# Patient Record
Sex: MALE | Race: BLACK OR AFRICAN AMERICAN | NOT HISPANIC OR LATINO | Employment: OTHER | URBAN - METROPOLITAN AREA
[De-identification: names, ages, dates, MRNs, and addresses within clinical notes are randomized per-mention and may not be internally consistent; named-entity substitution may affect disease eponyms.]

---

## 2024-09-04 ENCOUNTER — OFFICE VISIT (OUTPATIENT)
Dept: PRIMARY CARE CLINIC | Facility: CLINIC | Age: 79
End: 2024-09-04
Payer: MEDICARE

## 2024-09-04 ENCOUNTER — HOSPITAL ENCOUNTER (OUTPATIENT)
Dept: RADIOLOGY | Facility: HOSPITAL | Age: 79
Discharge: HOME | End: 2024-09-04
Attending: FAMILY MEDICINE
Payer: MEDICARE

## 2024-09-04 VITALS
BODY MASS INDEX: 22.3 KG/M2 | HEART RATE: 74 BPM | TEMPERATURE: 98 F | SYSTOLIC BLOOD PRESSURE: 126 MMHG | WEIGHT: 130.63 LBS | DIASTOLIC BLOOD PRESSURE: 70 MMHG | OXYGEN SATURATION: 99 % | HEIGHT: 64 IN

## 2024-09-04 DIAGNOSIS — N18.4 CKD (CHRONIC KIDNEY DISEASE) STAGE 4, GFR 15-29 ML/MIN: ICD-10-CM

## 2024-09-04 DIAGNOSIS — Z11.59 ENCOUNTER FOR HEPATITIS C SCREENING TEST FOR LOW RISK PATIENT: ICD-10-CM

## 2024-09-04 DIAGNOSIS — E78.2 MIXED HYPERLIPIDEMIA: ICD-10-CM

## 2024-09-04 DIAGNOSIS — J45.40 MODERATE PERSISTENT ASTHMA WITHOUT COMPLICATION: ICD-10-CM

## 2024-09-04 DIAGNOSIS — R06.02 SOB (SHORTNESS OF BREATH): Primary | ICD-10-CM

## 2024-09-04 DIAGNOSIS — E87.6 DIURETIC-INDUCED HYPOKALEMIA: ICD-10-CM

## 2024-09-04 DIAGNOSIS — I50.9 CONGESTIVE HEART FAILURE, UNSPECIFIED HF CHRONICITY, UNSPECIFIED HEART FAILURE TYPE: Chronic | ICD-10-CM

## 2024-09-04 DIAGNOSIS — K21.9 GERD WITHOUT ESOPHAGITIS: Chronic | ICD-10-CM

## 2024-09-04 DIAGNOSIS — T50.2X5A DIURETIC-INDUCED HYPOKALEMIA: ICD-10-CM

## 2024-09-04 DIAGNOSIS — J43.8 OTHER EMPHYSEMA: ICD-10-CM

## 2024-09-04 DIAGNOSIS — Z12.5 PROSTATE CANCER SCREENING: ICD-10-CM

## 2024-09-04 DIAGNOSIS — Z11.3 SCREEN FOR STD (SEXUALLY TRANSMITTED DISEASE): ICD-10-CM

## 2024-09-04 DIAGNOSIS — I10 ESSENTIAL HYPERTENSION: ICD-10-CM

## 2024-09-04 DIAGNOSIS — R73.03 PREDIABETES: Chronic | ICD-10-CM

## 2024-09-04 DIAGNOSIS — I50.9 CONGESTIVE HEART FAILURE, UNSPECIFIED HF CHRONICITY, UNSPECIFIED HEART FAILURE TYPE: ICD-10-CM

## 2024-09-04 PROBLEM — Z99.2 DEPENDENCE ON RENAL DIALYSIS: Status: ACTIVE | Noted: 2024-09-04

## 2024-09-04 PROBLEM — I50.23 ACUTE ON CHRONIC SYSTOLIC (CONGESTIVE) HEART FAILURE: Status: ACTIVE | Noted: 2024-09-04

## 2024-09-04 PROBLEM — I50.23 ACUTE ON CHRONIC SYSTOLIC (CONGESTIVE) HEART FAILURE: Status: RESOLVED | Noted: 2024-09-04 | Resolved: 2024-09-04

## 2024-09-04 PROBLEM — N25.81 SECONDARY HYPERPARATHYROIDISM OF RENAL ORIGIN: Status: ACTIVE | Noted: 2024-09-04

## 2024-09-04 PROCEDURE — 99205 OFFICE O/P NEW HI 60 MIN: CPT | Mod: S$GLB,,, | Performed by: FAMILY MEDICINE

## 2024-09-04 PROCEDURE — 1159F MED LIST DOCD IN RCRD: CPT | Mod: CPTII,S$GLB,, | Performed by: FAMILY MEDICINE

## 2024-09-04 PROCEDURE — 1160F RVW MEDS BY RX/DR IN RCRD: CPT | Mod: CPTII,S$GLB,, | Performed by: FAMILY MEDICINE

## 2024-09-04 PROCEDURE — 71046 X-RAY EXAM CHEST 2 VIEWS: CPT | Mod: TC,PN

## 2024-09-04 PROCEDURE — 71046 X-RAY EXAM CHEST 2 VIEWS: CPT | Mod: 26,,, | Performed by: STUDENT IN AN ORGANIZED HEALTH CARE EDUCATION/TRAINING PROGRAM

## 2024-09-04 PROCEDURE — 1126F AMNT PAIN NOTED NONE PRSNT: CPT | Mod: CPTII,S$GLB,, | Performed by: FAMILY MEDICINE

## 2024-09-04 PROCEDURE — 3078F DIAST BP <80 MM HG: CPT | Mod: CPTII,S$GLB,, | Performed by: FAMILY MEDICINE

## 2024-09-04 PROCEDURE — 1101F PT FALLS ASSESS-DOCD LE1/YR: CPT | Mod: CPTII,S$GLB,, | Performed by: FAMILY MEDICINE

## 2024-09-04 PROCEDURE — 3288F FALL RISK ASSESSMENT DOCD: CPT | Mod: CPTII,S$GLB,, | Performed by: FAMILY MEDICINE

## 2024-09-04 PROCEDURE — 3074F SYST BP LT 130 MM HG: CPT | Mod: CPTII,S$GLB,, | Performed by: FAMILY MEDICINE

## 2024-09-04 PROCEDURE — 99999 PR PBB SHADOW E&M-NEW PATIENT-LVL V: CPT | Mod: PBBFAC,,, | Performed by: FAMILY MEDICINE

## 2024-09-04 RX ORDER — BUMETANIDE 2 MG/1
TABLET ORAL
COMMUNITY

## 2024-09-04 RX ORDER — BUDESONIDE AND FORMOTEROL FUMARATE DIHYDRATE 160; 4.5 UG/1; UG/1
AEROSOL RESPIRATORY (INHALATION)
COMMUNITY
Start: 2024-03-11

## 2024-09-04 RX ORDER — BUDESONIDE AND FORMOTEROL FUMARATE DIHYDRATE 160; 4.5 UG/1; UG/1
2 AEROSOL RESPIRATORY (INHALATION) EVERY 12 HOURS
Qty: 10.2 G | Refills: 11 | Status: SHIPPED | OUTPATIENT
Start: 2024-09-04 | End: 2025-09-04

## 2024-09-04 RX ORDER — DOXYCYCLINE 100 MG/1
100 CAPSULE ORAL 2 TIMES DAILY
Qty: 20 CAPSULE | Refills: 0 | Status: SHIPPED | OUTPATIENT
Start: 2024-09-04 | End: 2024-09-14

## 2024-09-04 RX ORDER — ALBUTEROL SULFATE 90 UG/1
2 POWDER, METERED RESPIRATORY (INHALATION) EVERY 6 HOURS PRN
Qty: 1 EACH | Refills: 11 | Status: SHIPPED | OUTPATIENT
Start: 2024-09-04

## 2024-09-04 RX ORDER — ATORVASTATIN CALCIUM 80 MG/1
TABLET, FILM COATED ORAL
COMMUNITY

## 2024-09-04 RX ORDER — ESOMEPRAZOLE MAGNESIUM 40 MG/1
CAPSULE, DELAYED RELEASE ORAL
COMMUNITY

## 2024-09-04 RX ORDER — POTASSIUM CHLORIDE 750 MG/1
10 TABLET, EXTENDED RELEASE ORAL
COMMUNITY

## 2024-09-04 RX ORDER — IPRATROPIUM BROMIDE AND ALBUTEROL SULFATE 2.5; .5 MG/3ML; MG/3ML
3 SOLUTION RESPIRATORY (INHALATION)
Status: SHIPPED | OUTPATIENT
Start: 2024-09-04

## 2024-09-04 RX ORDER — EMPAGLIFLOZIN 10 MG/1
10 TABLET, FILM COATED ORAL
COMMUNITY

## 2024-09-04 RX ORDER — BUDESONIDE AND FORMOTEROL FUMARATE DIHYDRATE 80; 4.5 UG/1; UG/1
2 AEROSOL RESPIRATORY (INHALATION)
COMMUNITY
End: 2024-09-04 | Stop reason: SDUPTHER

## 2024-09-04 RX ORDER — ALBUTEROL SULFATE 90 UG/1
1-2 AEROSOL, METERED RESPIRATORY (INHALATION) DAILY PRN
COMMUNITY
End: 2024-09-04

## 2024-09-04 RX ORDER — CARVEDILOL 12.5 MG/1
12.5 TABLET ORAL 2 TIMES DAILY
COMMUNITY
Start: 2024-06-13

## 2024-09-04 RX ORDER — ALBUTEROL SULFATE 90 UG/1
POWDER, METERED RESPIRATORY (INHALATION)
COMMUNITY
Start: 2023-12-19 | End: 2024-09-04

## 2024-09-04 RX ORDER — IPRATROPIUM BROMIDE AND ALBUTEROL SULFATE 2.5; .5 MG/3ML; MG/3ML
SOLUTION RESPIRATORY (INHALATION)
COMMUNITY

## 2024-09-04 NOTE — PATIENT INSTRUCTIONS
DASH Diet   About this topic   DASH stands for Dietary Approaches to Stop Hypertension. The DASH diet may help you lower blood pressure. It may also help keep you from getting high blood pressure. You will eat less fat and more fiber on the DASH diet.  This diet gives you more minerals that fight high blood pressure. Some nutrients in this diet are:  Potassium ? Acts to help you get rid of salt. This may help to lower blood pressure.  Calcium ? Makes blood vessels and muscles work the right way  Magnesium - Helps blood vessels relax  Fiber ? Helps you feel full. It also helps digestion.  What will the results be?   The DASH diet may help you:  Lower your blood pressure and cholesterol  Lower your risk for cancer, heart disease, heart attack, and stroke. It may also lower your risk for heart failure, kidney stones, and diabetes.  Lose weight or keep a healthy weight  What lifestyle changes are needed?   Add regular exercise to get the most help from this diet.  Try to lower stress. Find ways to relax.  Stop smoking. Avoid secondhand smoke.  Limit alcohol intake.  What changes to diet are needed?   Know about poor eating habits. Then, you can fix them as you work with the program.  This diet encourages fruits and vegetables, whole grains, lean meats, healthy fats, and low-fat or fat-free dairy products.  This diet is lower in saturated fats, trans-fats, cholesterol, added sugars, and sodium.  Who should use this diet?   This eating plan is good for the whole family. It is also good for people with high blood pressure and those at risk for high blood pressure.  What foods are good to eat?   Grains: Try to eat 6 to 8 servings of whole grain, high fiber foods each day. These are bread, cereals, brown rice, or pasta.  Fruits and vegetables: Eat 4 to 5 servings each day. Try to pick many kinds and colors. Fresh or frozen are best. Look for low sodium or salt-free if you choose canned.  Dairy: Try to eat 2 to 3 servings of  fat free and low fat milk products each day.  Lean meats, poultry, and seafood: Try to eat 6 servings or less of lean meats, poultry, and seafood each day. Try to choose more low fat or lean meats like chicken and turkey. Eat less red meat. Eat more fish instead.  Nuts, seeds, and legumes (dry beans and peas): Try to eat 4 to 5 servings each week. Try to pick nuts such as almonds and walnuts, sunflower seeds, peanut butter, soy beans, lentils, kidney beans, and split peas.  Fats and oils: Try to eat 2 to 3 servings of fats and oils each day. Eat good fats found in fish, nuts, and avocados. Try using olive oil or vegetable oils such as canola oil. Other good oils to try are corn, safflower, sunflower, or soybean oils. Use low-sodium and low-fat salad dressing and mayonnaise.  Condiments: Pepper, herbs, spices, vinegar, lemon or lime juices are great for seasoning. Be careful to choose low-sodium or salt-free products if you use broths, soups, or soy sauce.  Sweets: Try to eat less than 5 servings each week. Choose low-fat and trans fat-free desserts. These are things like fruit flavored gelatin, sorbet, jelly beans, vincent crackers, animal crackers, low-fat fig bars, and kaila snaps. Eat fruit to satisfy your desire for sweets.     What foods should be limited or avoided?   Grains: Salted breads, rolls, crackers, quick breads, self-rising flours, biscuit mixes, regular bread crumbs

## 2024-09-04 NOTE — PROGRESS NOTES
"Subjective:      Chief Complaint   Patient presents with    [facility]     Chronic wheezing/ short winded   Pulmonary referral      Patient ID: Young Henry is a 78 y.o. male.  History of Present Illness      Young presents today for wheezing and breathing difficulties.    RESPIRATORY SYMPTOMS:  He reports experiencing wheezing and breathing problems for a few years, with symptoms worsening and unpredictably, occurring more than twice weekly. He experiences associated chest congestion, cough. Shortness of breath is worse when bending over. Symptoms can become severe, sometimes. He denies ever being diagnosed with COPD. He denies fever.    RESPIRATORY MANAGEMENT:  He uses a home nebulizer for symptom management, noting it's less effective than those in medical facilities, producing less "fog". After using the home nebulizer, he may not need it again for up to two weeks. He uses various inhalers, with the powder inhaler being more effective than mist formulations. He recently completed a prednisone taper but symptoms returned shortly after. He currently has about a 20-day supply of nebulizer medication remaining and requests a refill of his powder inhaler.    CARDIOVASCULAR HISTORY:  He has a history of congestive heart failure and past CABG. He denies problems since the surgery, except for shortness of breath and wheezing. He reports BLE and ankle swelling, which prompted a recent ultrasound by his cardiologist, Dr. Deshaun Acevedo, whom he saw yesterday. US was negative per pt. He does not know his EF, and we will get record from his office. DR Acevedo placed him on Jardiance which is helping with A1c.    MEDICAL HISTORY:  He reports pre-diabetes, managed with diet modifications. He also has CKD 4 and sees nephrologist.    RECENT MEDICAL VISITS:  He saw his cardiologist yesterday for an ultrasound due to ankle swelling. He also visited [facility] on August 17, 2024.    DIAGNOSTIC TESTS:  He denies having a chest XR in " the past year year and states he has never had a pulmonary function test.    SOCIAL HISTORY:  He lives alone and is able to drive during the day.    PRIMARY CARE:  He reports difficulty finding a primary care physician for close to a year. His last PCP was Dr. Arturo Hernandez, whom he saw 10 years ago. He expresses frustration with previous communication issues and struggles to secure a consistent PCP for medication refills and routine care.    ROS:  General: -fever, -chills, -fatigue, -weight gain, -weight loss  Eyes: -vision changes, -redness, -discharge  ENT: -ear pain, -nasal congestion, -sore throat  Cardiovascular: -chest pain, -palpitations, -lower extremity edema  Respiratory: +cough, +shortness of breath, +wheezing, +difficulty breathing, +chest congestion  Gastrointestinal: -abdominal pain, -nausea, -vomiting, -diarrhea, -constipation, -blood in stool  Genitourinary: -dysuria, -hematuria, -frequency  Musculoskeletal: -joint pain, -muscle pain  Skin: -rash, -lesion  Neurological: -headache, -dizziness, -numbness, -tingling  Psychiatric: -anxiety, -depression, -sleep difficulty       Young Coombss allergies, medications, history, and problem list were updated as appropriate.  Past Medical History:   Diagnosis Date    Acute on chronic systolic (congestive) heart failure 2024    CHF (congestive heart failure)      No family history on file.  Social History     Socioeconomic History    Marital status:    Tobacco Use    Smoking status: Never     Passive exposure: Never    Smokeless tobacco: Never   Substance and Sexual Activity    Alcohol use: Never    Drug use: Never     Outpatient Encounter Medications as of 2024   Medication Sig Dispense Refill    albuterol-ipratropium (DUO-NEB) 2.5 mg-0.5 mg/3 mL nebulizer solution SMARTSI Ampule(s) Via Nebulizer Every 8 Hours      atorvastatin (LIPITOR) 80 MG tablet TAKE 1 TABLET BY MOUTH ONCE DAILY Oral for 90 Days      bumetanide (BUMEX) 2 MG tablet Oral  "for 90 Days      carvediloL (COREG) 12.5 MG tablet Take 12.5 mg by mouth 2 (two) times daily.      JARDIANCE 10 mg tablet Take 10 mg by mouth.      NEXIUM 40 mg capsule Oral for 90 Days      potassium chloride (KLOR-CON) 10 MEQ TbSR Take 10 mEq by mouth.      SYMBICORT 160-4.5 mcg/actuation HFAA Inhalation for 30 Days      [DISCONTINUED] budesonide-formoterol 80-4.5 mcg (SYMBICORT) 80-4.5 mcg/actuation HFAA Inhale 2 puffs into the lungs.      [DISCONTINUED] PROAIR RESPICLICK 90 mcg/actuation inhaler Inhalation for 25 Days      [DISCONTINUED] VENTOLIN HFA 90 mcg/actuation inhaler Inhale 1-2 puffs into the lungs daily as needed.      budesonide-formoterol 160-4.5 mcg (SYMBICORT) 160-4.5 mcg/actuation HFAA Inhale 2 puffs into the lungs every 12 (twelve) hours. Controller 10.2 g 11    doxycycline (VIBRAMYCIN) 100 MG Cap Take 1 capsule (100 mg total) by mouth 2 (two) times daily. for 10 days 20 capsule 0    PROAIR RESPICLICK 90 mcg/actuation inhaler Inhale 2 puffs into the lungs every 6 (six) hours as needed for Wheezing. Rescue 1 each 11     Facility-Administered Encounter Medications as of 9/4/2024   Medication Dose Route Frequency Provider Last Rate Last Admin    albuterol-ipratropium 2.5 mg-0.5 mg/3 mL nebulizer solution 3 mL  3 mL Nebulization 1 time in Clinic/HOD               Objective:      /70   Pulse 74   Temp 97.9 °F (36.6 °C)   Ht 5' 4" (1.626 m)   Wt 59.2 kg (130 lb 9.6 oz)   SpO2 99%   BMI 22.42 kg/m²   Physical Exam    General: In no acute distress.  Head: Normocephalic. Non traumatic. SOB with 2 mins regular walk  Eyes: Conjunctivae clear  Ears: EACs clear. TMs normal.  Nose: Mucosa pink. Mucosa moist. No obstruction.  Throat: Clear. No exudates. No lesions.  Neck: Supple. No masses. No thyromegaly. No bruits.  Chest: + wheezes.  Heart: RRR. No murmurs.  Abdomen: Soft. No tenderness. No masses. BS normal.  Back: Normal curvature. No scoliosis. No tenderness.  Extremities: Warm. Well perfused. " No upper extremity edema. MILD EDEMA BILATERAL LOWER EXTREMITIES. FROM. No deformities. No joint erythema.  Neuro: No focal deficits appreciated. Good muscle tone. Normal response to visual stimuli. Normal response to auditory stimuli.  Skin: Normal. No rashes. No lesions noted.  Vitals: BLOOD PRESSURE: 126/70. SpO2: 99% (after using inhaler).        No results found for this or any previous visit.  Assessment/Plan:         1. SOB (shortness of breath)    2. CKD (chronic kidney disease) stage 4, GFR 15-29 ml/min    3. Congestive heart failure, unspecified HF chronicity, unspecified heart failure type    4. Essential hypertension    5. Moderate persistent asthma without complication    6. Mixed hyperlipidemia    7. Diuretic-induced hypokalemia    8. GERD without esophagitis    9. Prediabetes    10. Prostate cancer screening    11. Encounter for hepatitis C screening test for low risk patient    12. Screen for STD (sexually transmitted disease)    13. Other emphysema      SOB (shortness of breath)  -     Six Minute Walk Test to qualify for Home Oxygen; Future; Expected date: 09/04/2024    CKD (chronic kidney disease) stage 4, GFR 15-29 ml/min  Comments:  GFR 15.9, follow up with Nephrology    Congestive heart failure, unspecified HF chronicity, unspecified heart failure type  Comments:  Symptom with any activity  Orders:  -     Cancel: Echo Saline Bubble? Yes; Future  -     Cancel: EKG 12-lead; Future; Expected date: 09/04/2024  -     X-Ray Chest PA And Lateral; Future; Expected date: 09/04/2024  -     BNP; Future; Expected date: 09/04/2024  -     Cancel: Ambulatory referral/consult to Cardiology; Future; Expected date: 09/11/2024  -     Six Minute Walk Test to qualify for Home Oxygen; Future; Expected date: 09/04/2024    Essential hypertension  -     Microalbumin/Creatinine Ratio, Urine; Future; Expected date: 09/04/2024  -     Lipid Panel; Future; Expected date: 09/04/2024    Moderate persistent asthma without  complication  -     Complete PFT w/ bronchodilator; Future  -     X-Ray Chest PA And Lateral; Future; Expected date: 09/04/2024  -     Ambulatory referral/consult to Pulmonology; Future; Expected date: 09/11/2024  -     Six Minute Walk Test to qualify for Home Oxygen; Future; Expected date: 09/04/2024  -     doxycycline (VIBRAMYCIN) 100 MG Cap; Take 1 capsule (100 mg total) by mouth 2 (two) times daily. for 10 days  Dispense: 20 capsule; Refill: 0  -     budesonide-formoterol 160-4.5 mcg (SYMBICORT) 160-4.5 mcg/actuation HFAA; Inhale 2 puffs into the lungs every 12 (twelve) hours. Controller  Dispense: 10.2 g; Refill: 11  -     PROAIR RESPICLICK 90 mcg/actuation inhaler; Inhale 2 puffs into the lungs every 6 (six) hours as needed for Wheezing. Rescue  Dispense: 1 each; Refill: 11  -     albuterol-ipratropium 2.5 mg-0.5 mg/3 mL nebulizer solution 3 mL    Mixed hyperlipidemia    Diuretic-induced hypokalemia    GERD without esophagitis    Prediabetes  Comments:  continue dietary restrictions, jardiance will help    Prostate cancer screening  -     PSA, Screening; Future; Expected date: 09/04/2024    Encounter for hepatitis C screening test for low risk patient  -     Hepatitis C Antibody; Future; Expected date: 09/04/2024    Screen for STD (sexually transmitted disease)  -     Treponema Pallidium Antibodies IgG, IgM; Future; Expected date: 09/04/2024    Other emphysema  Comments:  per record, patient denies diagnosis      CONGESTIVE HEART FAILURE:  - Assessed patient with history of congestive heart failure presenting with wheezing and shortness of breath.  - Observed patient's SpO2 remained adequate (94%) during exertion, but significant dyspnea and inability to continue activity suggests cardiac etiology.  - Planned chest XR to evaluate for pulmonary congestion.  - Explained that shortness of breath may be related to congestive heart failure rather than primary lung condition.  - Continued current (diuretic)  regimen.  - Ordered chest XR, BNP lab test, 6-minute walk test, and pulmonary function test (PFT).   -Will get record from cardiology to assess the severity of CHF.  RESPIRATORY SYMPTOMS:  - Considered differential diagnoses including COPD, asthma, and fluid overload.  - Noted patient recently completed prednisone taper for congestion without significant improvement.  - Considered low-dose steroid continuation pending test results.  - Evaluated need for home oxygen therapy based on exertional symptoms.  - Discussed potential need for home oxygen therapy to improve quality of life.  - Young to avoid bending over when short of breath.  - Young to be cautious with activities that cause significant shortness of breath-pace himself.  - Refilled Spiriva (tiotropium) inhaler.  - Referred to Pulmonology for further evaluation of asthma as requested.      MEDICATIONS/SUPPLEMENTS:  - Started doxycycline, 1 tablet twice daily with food and low dose prednisone for 10 days    -Supplemental oxygen therapy would be provided as needed to treat hypoxemia (SpO2 <90 percent). Oxygen is not recommended as routine therapy in patients without hypoxemia, as it may cause vasoconstriction and reduction in cardiac output. Waiting on the 6 min walk to assess hissat.  Attempt to walk him in the clinic failed as he was able to walk for one minute and sat remained above 90%.    FOLLOW UP:  - Follow up in 4 weeks or sooner based on test results.  - Contact office if symptoms worsen before next appointment.            I have reviewed all of the patient's clinical history available in care everywhere and Epic and have utilized this in my evaluation and management recommendations today.      Treatment options and alternatives were discussed with the patient. Patient was given ample time to ask questions. All questions were answered. Voices understanding and acceptance of this advice. Will call back if any further questions or concerns.         I spent  a total of 65 minutes face to face and non-face to face on the date of this visit.This includes time preparing to see the patient (eg, review of tests, notes), obtaining and/or reviewing additional history from an independent historian and/or outside medical records, documenting clinical information in the electronic health record, independently interpreting results and/or communicating results to the patient/family/caregiver, or care coordinator.  Visit today included increased complexity associated with the care of the episodic problem addressed and managing the longitudinal care of the patient due to the serious and/or complex managed problem(s).    Portions of the record may have been created with voice recognition software. Occasional wrong-word or sound-a-like substitutions may have occurred due to the inherent limitations of voice recognition software. Read the chart carefully and recognize, using context, where substitutions have occurred.      This note was generated with the assistance of ambient listening technology. Verbal consent was obtained by the patient and accompanying visitor(s) for the recording of patient appointment to facilitate this note. I attest to having reviewed and edited the generated note for accuracy, though some syntax or spelling errors may persist. Please contact the author of this note for any clarification.            Loly Yoo MD  [facility],

## 2024-09-05 ENCOUNTER — TELEPHONE (OUTPATIENT)
Dept: PRIMARY CARE CLINIC | Facility: CLINIC | Age: 79
End: 2024-09-05
Payer: MEDICARE

## 2024-09-05 NOTE — TELEPHONE ENCOUNTER
I have attempted without success to contact this patient by phone to discuss lab results.       ----- Message from Loly Yoo MD sent at 9/5/2024 10:12 AM CDT -----  I have reviewed your lab result and one or more results are ABNORMAL.  Lab shows that you still in fluid overload, please follow up with Dr Acevedo for management.  The rest of your lab are normal

## 2024-09-05 NOTE — TELEPHONE ENCOUNTER
I have attempted without success to contact this patient by phone to discuss lab results.           ----- Message from Loly Yoo MD sent at 9/5/2024  9:47 AM CDT -----  We use urine microalbumin to screen for kidney damage.  Your result is abnormal.  Continue all your medications and we will monitor with lab.

## 2024-09-10 ENCOUNTER — TELEPHONE (OUTPATIENT)
Dept: PRIMARY CARE CLINIC | Facility: CLINIC | Age: 79
End: 2024-09-10
Payer: MEDICARE
